# Patient Record
Sex: FEMALE | Race: WHITE
[De-identification: names, ages, dates, MRNs, and addresses within clinical notes are randomized per-mention and may not be internally consistent; named-entity substitution may affect disease eponyms.]

---

## 2019-06-10 ENCOUNTER — HOSPITAL ENCOUNTER (OUTPATIENT)
Dept: HOSPITAL 47 - RADXRMAIN | Age: 19
Discharge: HOME | End: 2019-06-10
Attending: FAMILY MEDICINE
Payer: COMMERCIAL

## 2019-06-10 DIAGNOSIS — Z98.890: ICD-10-CM

## 2019-06-10 DIAGNOSIS — M85.60: Primary | ICD-10-CM

## 2019-06-10 NOTE — XR
EXAMINATION TYPE: XR tibia fibula RT

 

DATE OF EXAM: 6/10/2019

 

COMPARISON: NONE

 

HISTORY: Pain

 

TECHNIQUE:  Two views are submitted.

 

FINDINGS:

The osseous structures are intact.  The joint spaces are preserved.  Postsurgical change overlying th
e medial aspect of the tibia.

 

IMPRESSION: 

1. No acute osseous abnormality. Postsurgical changes noted.